# Patient Record
Sex: FEMALE | Race: WHITE
[De-identification: names, ages, dates, MRNs, and addresses within clinical notes are randomized per-mention and may not be internally consistent; named-entity substitution may affect disease eponyms.]

---

## 2017-03-13 ENCOUNTER — HOSPITAL ENCOUNTER (OUTPATIENT)
Dept: HOSPITAL 19 - SDCO | Age: 77
Discharge: HOME | End: 2017-03-13
Attending: INTERNAL MEDICINE
Payer: MEDICARE

## 2017-03-13 VITALS — HEART RATE: 65 BPM | SYSTOLIC BLOOD PRESSURE: 95 MMHG | DIASTOLIC BLOOD PRESSURE: 66 MMHG

## 2017-03-13 VITALS — WEIGHT: 133.38 LBS | HEIGHT: 62.99 IN | BODY MASS INDEX: 23.63 KG/M2

## 2017-03-13 VITALS — HEART RATE: 77 BPM | SYSTOLIC BLOOD PRESSURE: 118 MMHG | DIASTOLIC BLOOD PRESSURE: 77 MMHG | TEMPERATURE: 98.5 F

## 2017-03-13 VITALS — SYSTOLIC BLOOD PRESSURE: 116 MMHG | HEART RATE: 64 BPM | DIASTOLIC BLOOD PRESSURE: 70 MMHG

## 2017-03-13 VITALS — DIASTOLIC BLOOD PRESSURE: 63 MMHG | SYSTOLIC BLOOD PRESSURE: 112 MMHG | HEART RATE: 62 BPM

## 2017-03-13 VITALS — TEMPERATURE: 97.8 F | HEART RATE: 64 BPM | SYSTOLIC BLOOD PRESSURE: 111 MMHG | DIASTOLIC BLOOD PRESSURE: 66 MMHG

## 2017-03-13 VITALS — HEART RATE: 63 BPM | DIASTOLIC BLOOD PRESSURE: 65 MMHG | SYSTOLIC BLOOD PRESSURE: 114 MMHG

## 2017-03-13 DIAGNOSIS — D12.4: ICD-10-CM

## 2017-03-13 DIAGNOSIS — Z86.010: Primary | ICD-10-CM

## 2017-03-13 DIAGNOSIS — D12.3: ICD-10-CM

## 2017-03-13 DIAGNOSIS — K64.0: ICD-10-CM

## 2017-03-13 DIAGNOSIS — K63.89: ICD-10-CM

## 2020-07-10 ENCOUNTER — HOSPITAL ENCOUNTER (OUTPATIENT)
Dept: HOSPITAL 19 - COL.LAB | Age: 80
End: 2020-07-10
Attending: INTERNAL MEDICINE
Payer: MEDICARE

## 2020-07-10 DIAGNOSIS — Z20.828: ICD-10-CM

## 2020-07-10 DIAGNOSIS — Z01.812: Primary | ICD-10-CM

## 2020-07-10 LAB
ANION GAP SERPL CALC-SCNC: 6 MMOL/L (ref 7–16)
BUN SERPL-MCNC: 20 MG/DL (ref 7–17)
CALCIUM SERPL-MCNC: 10.4 MG/DL (ref 8.4–10.2)
CHLORIDE SERPL-SCNC: 103 MMOL/L (ref 98–107)
CO2 SERPL-SCNC: 29 MMOL/L (ref 22–30)
CREAT SERPL-SCNC: 0.95 UMOL/L (ref 0.52–1.25)
ERYTHROCYTE [DISTWIDTH] IN BLOOD BY AUTOMATED COUNT: 13.2 % (ref 11.5–14.5)
GLUCOSE SERPL-MCNC: 103 MG/DL (ref 74–106)
HCT VFR BLD AUTO: 48.1 % (ref 37–47)
HGB BLD-MCNC: 15.9 G/DL (ref 12.5–16)
MCH RBC QN AUTO: 30 PG (ref 27–31)
MCHC RBC AUTO-ENTMCNC: 33 G/DL (ref 33–37)
MCV RBC AUTO: 92 FL (ref 80–100)
PLATELET # BLD AUTO: 172 K/MM3 (ref 130–400)
PMV BLD AUTO: 12.7 FL (ref 7.4–10.4)
POTASSIUM SERPL-SCNC: 4.5 MMOL/L (ref 3.4–5)
RBC # BLD AUTO: 5.25 M/MM3 (ref 4.1–5.3)
SODIUM SERPL-SCNC: 138 MMOL/L (ref 137–145)

## 2022-08-30 ENCOUNTER — HOSPITAL ENCOUNTER (OUTPATIENT)
Dept: HOSPITAL 19 - SDCO | Age: 82
Discharge: HOME | End: 2022-08-30
Attending: INTERNAL MEDICINE
Payer: MEDICARE

## 2022-08-30 VITALS — DIASTOLIC BLOOD PRESSURE: 68 MMHG | HEART RATE: 58 BPM | SYSTOLIC BLOOD PRESSURE: 136 MMHG

## 2022-08-30 VITALS — HEART RATE: 58 BPM | SYSTOLIC BLOOD PRESSURE: 151 MMHG | DIASTOLIC BLOOD PRESSURE: 65 MMHG | TEMPERATURE: 96.9 F

## 2022-08-30 VITALS — HEART RATE: 59 BPM | DIASTOLIC BLOOD PRESSURE: 64 MMHG | SYSTOLIC BLOOD PRESSURE: 123 MMHG | TEMPERATURE: 97 F

## 2022-08-30 VITALS — WEIGHT: 138.89 LBS | BODY MASS INDEX: 25.56 KG/M2 | HEIGHT: 62 IN

## 2022-08-30 VITALS — DIASTOLIC BLOOD PRESSURE: 80 MMHG | HEART RATE: 55 BPM | SYSTOLIC BLOOD PRESSURE: 129 MMHG

## 2022-08-30 DIAGNOSIS — Z85.820: ICD-10-CM

## 2022-08-30 DIAGNOSIS — K51.40: ICD-10-CM

## 2022-08-30 DIAGNOSIS — I25.2: ICD-10-CM

## 2022-08-30 DIAGNOSIS — Z85.3: ICD-10-CM

## 2022-08-30 DIAGNOSIS — Z79.82: ICD-10-CM

## 2022-08-30 DIAGNOSIS — Z95.2: ICD-10-CM

## 2022-08-30 DIAGNOSIS — K64.0: ICD-10-CM

## 2022-08-30 DIAGNOSIS — K63.5: ICD-10-CM

## 2022-08-30 DIAGNOSIS — Z85.038: ICD-10-CM

## 2022-08-30 DIAGNOSIS — Z12.11: Primary | ICD-10-CM

## 2022-08-30 DIAGNOSIS — Z86.79: ICD-10-CM

## 2022-08-30 DIAGNOSIS — Z98.0: ICD-10-CM

## 2022-08-30 DIAGNOSIS — I10: ICD-10-CM

## 2022-08-30 DIAGNOSIS — Z95.0: ICD-10-CM

## 2022-08-30 DIAGNOSIS — Z79.01: ICD-10-CM

## 2022-08-30 NOTE — NUR
0935 - VSS. IV discontinued. Catheter tip intact. Pressure bandage applied and
no redness/swelling noted. DC instructions and educational material reviewed
w/ PT, who verbalized understanding and signed the related paperwork.
Questions answered to PT satisfaction. PT refused RN assistance changing into
personal clothes; daughter remains present and states will assist. Call bell
within reach.

## 2022-08-30 NOTE — NUR
0945 - PT dismissed from endo via wheelchair by Sarah TATE to PT entrence.
PT's daughter has DC packet and PT personal belongings; PT was tranferred into
the care of her daugher, who is driving private car.

## 2022-08-30 NOTE — NUR
0905 - PT arrives from procedure and was settled by Mitzy TATE. Verbal report
then obtained. PT provided snack and drink. Visitor remains present. Will
monitor per intervals; call bell is within reach if needed.

## 2022-08-30 NOTE — NUR
0920 - VSS. PT has finished snack and drink; denies pain/nasuea and expressed
desire to be discharged. Call bell remains within reach.  has spoken w/ PT.
Non-slip socks remain on.